# Patient Record
Sex: FEMALE | Race: BLACK OR AFRICAN AMERICAN | NOT HISPANIC OR LATINO
[De-identification: names, ages, dates, MRNs, and addresses within clinical notes are randomized per-mention and may not be internally consistent; named-entity substitution may affect disease eponyms.]

---

## 2018-06-05 ENCOUNTER — RECORDS - HEALTHEAST (OUTPATIENT)
Dept: ADMINISTRATIVE | Facility: OTHER | Age: 33
End: 2018-06-05

## 2018-06-05 LAB
ANTIBODY_EXT (HISTORICAL CONVERSION): NEGATIVE
HBSAG_EXT (HISTORICAL CONVERSION): NEGATIVE
HGB_EXT (HISTORICAL CONVERSION): 12.2
HIV_EXT: NEGATIVE
PLT_EXT - HISTORICAL: 293
RPR - HISTORICAL: NORMAL
RUBELLA_EXT (HISTORICAL CONVERSION): NORMAL

## 2018-09-19 ENCOUNTER — HOSPITAL ENCOUNTER (OUTPATIENT)
Dept: OBGYN | Facility: HOSPITAL | Age: 33
Discharge: HOME | End: 2018-09-19
Attending: ADVANCED PRACTICE MIDWIFE | Admitting: ADVANCED PRACTICE MIDWIFE

## 2018-09-19 ASSESSMENT — MIFFLIN-ST. JEOR: SCORE: 1864.82

## 2018-09-21 ENCOUNTER — HOSPITAL ENCOUNTER (OUTPATIENT)
Dept: OBGYN | Facility: HOSPITAL | Age: 33
Discharge: HOME | End: 2018-09-21
Attending: ADVANCED PRACTICE MIDWIFE | Admitting: ADVANCED PRACTICE MIDWIFE

## 2018-10-19 ENCOUNTER — ANESTHESIA - HEALTHEAST (OUTPATIENT)
Dept: OBGYN | Facility: HOSPITAL | Age: 33
End: 2018-10-19

## 2021-06-02 VITALS — HEIGHT: 62 IN | BODY MASS INDEX: 48.95 KG/M2 | WEIGHT: 266 LBS

## 2021-06-20 NOTE — PROGRESS NOTES
Cornelio presents from [facility] after a BPP 6/8 and an NST with possible decelerations. EFM applied by Torsten GOULD. Questionable deceleration but baby not monitoring well at that time due to anterior placenta and fetal position. She was repositioned on right side with no further decelerations. Priyanka NINO notified. Orders received. Continuous monito readjustments due to fetal movement and anterior placenta.

## 2021-06-20 NOTE — PROGRESS NOTES
OBGYN Note    Pt is a  at 34 weeks sent over from office for a decel heard on nst- not actually seen. Bpp . On L and D she has now had 3 hours of Cat 1 tracing on NST. Repeated bpp- . At this pt will send home and tell her to keep apt next week in clinic.     Laurence Trejo MD  2018 3:56 PM  OBGYN  [facility]  Pager [phone]  Cell

## 2021-06-20 NOTE — PROGRESS NOTES
Review of EFM NST telephoned to Shannan Shukla CNM.  Reviewed strip and noted Mod Variablility, accels.  Variable reviewed via telephone with [facility].  Orders received for dc to home.   Reviewed DC order with stated understanding to [employer].  Discharged to home ambulatory,

## 2021-06-20 NOTE — PROGRESS NOTES
"Giving pt discharge paperwork and requested tylenol for a headache she was getting.  Called Dr. Trejo and notified her of \"giving pt discharge paperwork and pt requesting tylenol for head she was getting\" per Dr. Trejo tylenol orders received and pt may be discharged.    "

## 2021-06-21 NOTE — ANESTHESIA POSTPROCEDURE EVALUATION
Patient: Cornelio Mccord  * No procedures listed *  Anesthesia type: epidural    Patient location: Labor and Delivery  Last vitals:   Vitals:    10/20/18 0837   BP: 127/59   Pulse: 71   Resp: 18   Temp: 36.9  C (98.4  F)   SpO2: 99%     Post vital signs: stable  Level of consciousness: awake and responds to simple questions  Post-anesthesia pain: pain controlled  Post-anesthesia nausea and vomiting: no  Pulmonary: unassisted, return to baseline  Cardiovascular: stable and blood pressure at baseline  Hydration: adequate  Anesthetic events: no    QCDR Measures:  ASA# 11 - Rhonda-op Cardiac Arrest: ASA11B - Patient did NOT experience unanticipated cardiac arrest  ASA# 12 - Rhonda-op Mortality Rate: ASA12B - Patient did NOT die  ASA# 13 - PACU Re-Intubation Rate: ASA13B - Patient did NOT require a new airway mgmt  ASA# 10 - Composite Anes Safety: ASA10A - No serious adverse event    Additional Notes:  Neuraxial block has worn off, no residual numbness or weakness. Pain controlled. Denies headache or back pain. No further questions or concerns. Instructed that we are available 24/7 should she have questions pertaining to her epidural.

## 2021-06-21 NOTE — ANESTHESIA PROCEDURE NOTES
Epidural Block    Patient location during procedure: OB  Time Called: 10/19/2018 2:02 PM  Reason for Block:labor epidural  Staffing:  Performing  Anesthesiologist: MICHAEL WYNN  Preanesthetic Checklist  Completed: patient identified, risks, benefits, and alternatives discussed, timeout performed, consent obtained, at patient's request, airway assessed, oxygen available, suction available, emergency drugs available and hand hygiene performed  Procedure  Patient position: sitting  Prep: ChloraPrep  Patient monitoring: continuous pulse oximetry, heart rate and blood pressure  Approach: midline  Location: L4-L5  Injection technique: BRITTANY saline  Number of Attempts:1  Needle  Needle type: Rudolph   Needle gauge: 18 G     Catheter in Space: 5  Assessment  Sensory level: T12  No complications      Additional Notes:  xiao well

## 2021-06-21 NOTE — ANESTHESIA PREPROCEDURE EVALUATION
Anesthesia Evaluation      Patient summary reviewed     Airway   Mallampati: III  Neck ROM: full   Pulmonary - negative ROS and normal exam    breath sounds clear to auscultation                         Cardiovascular - negative ROS and normal exam  Rhythm: regular  Rate: normal,         Neuro/Psych - negative ROS     Endo/Other    (+) obesity, pregnant     GI/Hepatic/Renal - negative ROS           Dental - normal exam                        Anesthesia Plan  Planned anesthetic: epidural    ASA 3     Anesthetic plan and risks discussed with: patient